# Patient Record
Sex: FEMALE | Race: WHITE
[De-identification: names, ages, dates, MRNs, and addresses within clinical notes are randomized per-mention and may not be internally consistent; named-entity substitution may affect disease eponyms.]

---

## 2022-12-13 ENCOUNTER — HOSPITAL ENCOUNTER (EMERGENCY)
Dept: HOSPITAL 4 - SED | Age: 22
LOS: 1 days | Discharge: LEFT BEFORE DISCHARGE | End: 2022-12-14
Payer: MEDICAID

## 2022-12-13 VITALS — SYSTOLIC BLOOD PRESSURE: 122 MMHG

## 2022-12-13 VITALS — BODY MASS INDEX: 24.92 KG/M2 | WEIGHT: 132 LBS | HEIGHT: 61 IN

## 2022-12-13 DIAGNOSIS — Z53.21: ICD-10-CM

## 2022-12-13 DIAGNOSIS — M25.572: ICD-10-CM

## 2022-12-13 DIAGNOSIS — M25.571: Primary | ICD-10-CM
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
